# Patient Record
Sex: FEMALE | URBAN - METROPOLITAN AREA
[De-identification: names, ages, dates, MRNs, and addresses within clinical notes are randomized per-mention and may not be internally consistent; named-entity substitution may affect disease eponyms.]

---

## 2023-12-19 ENCOUNTER — OFFICE VISIT (OUTPATIENT)
Dept: PLASTIC SURGERY | Facility: CLINIC | Age: 23
End: 2023-12-19
Payer: COMMERCIAL

## 2023-12-19 DIAGNOSIS — J34.89 NASAL OBSTRUCTION: Primary | ICD-10-CM

## 2023-12-19 DIAGNOSIS — M95.0 ACQUIRED NASAL DEFORMITY: ICD-10-CM

## 2023-12-19 NOTE — PROGRESS NOTES
Facial Plastic and Reconstructive Surgery  12/19/23     Tory Mcclain is a 23-year-old female presenting for evaluation of her nose.  She is a  at an [employer] and suffered a fracture to her nose 2 months ago in October resulting in significant deformity.  She also suffered an orbital floor fracture at the same time.  No procedure was performed.  She never had surgery on her nose before.  She has some restriction in breathing on the left side compared with the right.  She has a slight bump on the left that she is not terribly bothered by, but has noticed since the injury.    Past medical history: None  Past surgical history ACL surgery, meniscus surgery, no issues with general anesthesia  Smoking: None  Medication: None  Allergies: No known drug allergies    On exam, she has significant deviation of her nasal bones to the left.  Anterior rhinoscopy reveals a relatively straight septum with some narrowing of the valve secondary to bony deviation.  There is no septal perforation. Turbinates are mildly enlarged.      Assessment plan: This is an otherwise healthy 23-year-old female presenting for evaluation of her nose.  She suffered a significant nasal bone fracture 2 months ago in October, and no procedure was performed at that time.  Now, she has a significant deformity and some obstruction to her breathing on the left side.  We discussed that her septum is relatively straight on exam and therefore I would perform an endonasal osteotomy to move her bones back to the middle.  This would be done under general anesthesia.  Risks and benefits of the procedure were discussed. Risks and benefits were discussed including but not limited to bleeding, infection, asymmetry, persistent or worsened breathing, septal perforation, numbness/tingling, need for additional procedures.     Loren Taylor MD

## 2023-12-22 DIAGNOSIS — J34.89 NASAL OBSTRUCTION: Primary | ICD-10-CM

## 2023-12-22 DIAGNOSIS — M95.0 ACQUIRED NASAL DEFORMITY: ICD-10-CM

## 2023-12-22 NOTE — PROGRESS NOTES
Updated photodocumentation obtained (see media tab).    Case request entered for rhinoplasty. The clinic will reach out to schedule once we hear back about prior auth.    Siobhan Mistry RN  12/22/2023 1:30 PM

## 2024-01-18 ENCOUNTER — TELEPHONE (OUTPATIENT)
Dept: PLASTIC SURGERY | Facility: CLINIC | Age: 24
End: 2024-01-18

## 2024-01-18 NOTE — TELEPHONE ENCOUNTER
Called patient to inform her that her surgery has been approved.     Left voicemail.     Madalyn Nice  1/18/24

## 2024-03-27 ENCOUNTER — TRANSFERRED RECORDS (OUTPATIENT)
Dept: HEALTH INFORMATION MANAGEMENT | Facility: CLINIC | Age: 24
End: 2024-03-27

## 2024-03-27 DIAGNOSIS — Z98.890 POSTOPERATIVE STATE: Primary | ICD-10-CM

## 2024-03-27 RX ORDER — OXYCODONE HYDROCHLORIDE 5 MG/1
5 TABLET ORAL EVERY 6 HOURS PRN
Qty: 8 TABLET | Refills: 0 | Status: SHIPPED | OUTPATIENT
Start: 2024-03-27 | End: 2024-03-30

## 2024-03-27 RX ORDER — ONDANSETRON 4 MG/1
4 TABLET, ORALLY DISINTEGRATING ORAL EVERY 6 HOURS PRN
Qty: 12 TABLET | Refills: 0 | Status: SHIPPED | OUTPATIENT
Start: 2024-03-27

## 2024-03-27 RX ORDER — ECHINACEA PURPUREA EXTRACT 125 MG
TABLET ORAL
Qty: 44 ML | Refills: 11 | Status: SHIPPED | OUTPATIENT
Start: 2024-03-27

## 2024-04-03 ENCOUNTER — OFFICE VISIT (OUTPATIENT)
Dept: PLASTIC SURGERY | Facility: CLINIC | Age: 24
End: 2024-04-03
Payer: COMMERCIAL

## 2024-04-03 DIAGNOSIS — Z98.890 POSTOPERATIVE STATE: Primary | ICD-10-CM

## 2024-04-03 NOTE — PROGRESS NOTES
Saw Tory one week s/p Rhinoplasty (3/27/2024).    She reports she is doing well and is breathing easily. Nasal cast removed without difficulty. Nasal mucosa is pink and moist. She reports she has been diligent with nasal saline, encouraged her to continue this. Pt's nose is appropriately swollen and tender.    We reviewed continued cares and realistic healing expectations not limited to avoiding submerging the nose, limiting pressure on the nose, continued use of nasal saline, and two week activity restrictions.    Pt reports is understanding of this. All questions answered at this time, pt instructed to reach out if questions or concerns arise.    Follow-up scheduled.    Siobhan Mistry RN  4/3/2024 10:05 AM

## 2024-05-28 ENCOUNTER — OFFICE VISIT (OUTPATIENT)
Dept: PLASTIC SURGERY | Facility: CLINIC | Age: 24
End: 2024-05-28
Payer: COMMERCIAL

## 2024-05-28 DIAGNOSIS — Z98.890 POSTOPERATIVE STATE: Primary | ICD-10-CM

## 2024-05-28 NOTE — PROGRESS NOTES
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 Today, she reports she is doing well. No concerns. Swelling fluctuates.     On exam, she has expected edema.     A/P: She is now 3 months s/p endonasal rhinoplasty. Doing well. Things are healing nicely.  I would like to see her back in 3-4 months, sooner if there are issues.     I spent a total of 10 minutes in the care of patient Tory Mcclain during today's office visit. This time includes reviewing the patient's chart and prior history, obtaining a history, performing an examination and evaluation and counseling the patient. This time also includes ordering mediations or tests necessary in addition to communication to other member's of the patient's health care team. Time spent in documentation and care coordination is included.

## 2024-09-24 ENCOUNTER — OFFICE VISIT (OUTPATIENT)
Dept: PLASTIC SURGERY | Facility: CLINIC | Age: 24
End: 2024-09-24
Payer: COMMERCIAL

## 2024-09-24 DIAGNOSIS — Z98.890 S/P NASAL SURGERY: Primary | ICD-10-CM

## 2024-09-24 NOTE — PROGRESS NOTES
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 Today, she reports she is doing well. She is breathing much better. She does get some thick boogers out at times. Not using any sprays.     On exam, intranasal mucosa is nicely healed and airway widely patent.      A/P: She is now 7 months s/p endonasal rhinoplasty. Doing well. Things are healing nicely. She is leaving to go back to [facility] (her boyfriend is playing basketball there). She can follow up as needed, certainly if there are any issues. She can restart nasal saline spray to help with moisture.      I spent a total of 10 minutes in the care of patient Tory Mcclain during today's office visit. This time includes reviewing the patient's chart and prior history, obtaining a history, performing an examination and evaluation and counseling the patient. This time also includes ordering mediations or tests necessary in addition to communication to other member's of the patient's health care team. Time spent in documentation and care coordination is included.